# Patient Record
Sex: MALE | URBAN - METROPOLITAN AREA
[De-identification: names, ages, dates, MRNs, and addresses within clinical notes are randomized per-mention and may not be internally consistent; named-entity substitution may affect disease eponyms.]

---

## 2019-08-08 NOTE — TELEPHONE ENCOUNTER
RECORDS RECEIVED FROM: Low Back Pain. pain spreading down to left. No recent imaging. History of MID back surgery 2008. Appt per pt.    DATE RECEIVED: 8/8   NOTES STATUS DETAILS   OFFICE NOTE from referring provider N/A    OFFICE NOTE from other specialist Care Everywhere    DISCHARGE SUMMARY from hospital N/A    DISCHARGE REPORT from the ER N/A    OPERATIVE REPORT Care everywhere/ internal Internal 7/2/15  [facility] 2/18/08   MEDICATION LIST Internal    LABS     CBC/DIFF N/A    CULTURES N/A    MRI Internal    CT SCAN N/A    XRAYS (IMAGES & REPORTS) recieved [facility] 2008   TUMOR     PATHOLOGY  Slides & report N/A

## 2019-08-13 ENCOUNTER — PRE VISIT (OUTPATIENT)
Dept: ORTHOPEDICS | Facility: CLINIC | Age: 59
End: 2019-08-13

## 2019-08-13 ENCOUNTER — ANCILLARY PROCEDURE (OUTPATIENT)
Dept: GENERAL RADIOLOGY | Facility: CLINIC | Age: 59
End: 2019-08-13
Attending: FAMILY MEDICINE
Payer: COMMERCIAL

## 2019-08-13 ENCOUNTER — OFFICE VISIT (OUTPATIENT)
Dept: ORTHOPEDICS | Facility: CLINIC | Age: 59
End: 2019-08-13
Payer: COMMERCIAL

## 2019-08-13 VITALS
HEART RATE: 67 BPM | HEIGHT: 69 IN | SYSTOLIC BLOOD PRESSURE: 107 MMHG | BODY MASS INDEX: 22.96 KG/M2 | DIASTOLIC BLOOD PRESSURE: 74 MMHG | WEIGHT: 155 LBS

## 2019-08-13 DIAGNOSIS — Z98.1 HISTORY OF LUMBAR FUSION: ICD-10-CM

## 2019-08-13 DIAGNOSIS — M53.3 SACROILIAC JOINT DYSFUNCTION OF LEFT SIDE: Primary | ICD-10-CM

## 2019-08-13 DIAGNOSIS — M53.3 SACROILIAC JOINT DYSFUNCTION OF LEFT SIDE: ICD-10-CM

## 2019-08-13 RX ORDER — DIMENHYDRINATE 50 MG
TABLET ORAL
COMMUNITY
Start: 2016-01-01

## 2019-08-13 ASSESSMENT — PAIN SCALES - GENERAL: PAINLEVEL: MILD PAIN (3)

## 2019-08-13 ASSESSMENT — MIFFLIN-ST. JEOR: SCORE: 1508.46
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
## 2019-08-22 ENCOUNTER — THERAPY VISIT (OUTPATIENT)
Dept: PHYSICAL THERAPY | Facility: CLINIC | Age: 59
End: 2019-08-22
Attending: FAMILY MEDICINE
Payer: COMMERCIAL

## 2019-08-22 DIAGNOSIS — M53.3 SACROILIAC JOINT DYSFUNCTION OF LEFT SIDE: ICD-10-CM

## 2019-08-22 DIAGNOSIS — Z98.1 HISTORY OF LUMBAR FUSION: ICD-10-CM

## 2019-08-22 PROCEDURE — 97530 THERAPEUTIC ACTIVITIES: CPT | Mod: GP | Performed by: PHYSICAL THERAPIST

## 2019-08-22 PROCEDURE — 97110 THERAPEUTIC EXERCISES: CPT | Mod: GP | Performed by: PHYSICAL THERAPIST

## 2019-08-22 PROCEDURE — 97161 PT EVAL LOW COMPLEX 20 MIN: CPT | Mod: GP | Performed by: PHYSICAL THERAPIST

## 2019-08-22 NOTE — PROGRESS NOTES
[facility] Initial Evaluation  Subjective:  Pt is doing hiking/backpacking, running and biking, core exercises, hip strengthening, and squats on her own.         Walker Axt being seen for Uneven si hip joint...pain.   Problem began 7/8/2019. Where condition occurred: at home.Problem occurred: Pushing a lawn tractor  and reported as 3/10 on pain scale. General health as reported by patient is excellent. Pertinent medical history includes:  Depression.    Surgeries include:  Other. Other surgery history details: Spinal fussion (2008); left shoulder (2015); FFS (2014 & 2001); knee (1976).  Current medications:  Other. Other medications details: HRT related medications, melatonin for sleep.   Primary job tasks include:  Computer work and prolonged sitting.  Pain is described as sharp and is intermittent. Pain is the same all the time. Since onset symptoms are gradually improving. Special tests:  X-ray. Previous treatment includes chiropractic. There was mild improvement following previous treatment.   Patient is Nuclear . Restrictions include:  Working in normal job without restrictions.    Barriers include:  Other.  Red flags:  Pain at rest/night.  Type of problem:  Sacroiliac    This is a new condition    Patient reports pain:  SI joint left.   Exacerbated by: bending forward, exercise.                       Objective:  System         Lumbar/SI Evaluation  ROM:    AROM Lumbar:   Flexion:           50%, ++  Ext:                      Side Bend:        Left:  50%    Right:  50%  Rotation:           Left:  100%    Right:  50%, +  Side Glide:        Left:     Right:                       Functional Tests:  Core strength and proprioception lumbar: Squat WFL.  SL Squat increased loss of control on L.                                                   Knee Evaluation:  ROM:  Prom wnl knee: Quad flexibility limited.                                  General  Evaluation:                              Gait:  Gait wnl general: WNL.                                         ROS    Assessment/Plan:    Patient is a 59 year old adult with sacral complaints.    Patient has the following significant findings with corresponding treatment plan.                Diagnosis 1:  L SI joint pain  Decreased ROM/flexibility - manual therapy and therapeutic exercise  Decreased joint mobility - manual therapy and therapeutic exercise  Decreased strength - therapeutic exercise and therapeutic activities  Impaired muscle performance - neuro re-education  Decreased function - therapeutic activities    Therapy Evaluation Codes:   1) History comprised of:   Personal factors that impact the plan of care:      None.    Comorbidity factors that impact the plan of care are:      None.     Medications impacting care: None.  2) Examination of Body Systems comprised of:   Body structures and functions that impact the plan of care:      Lumbar spine and Sacral illiac joint.   Activity limitations that impact the plan of care are:      Bending, Lifting and Sports.  3) Clinical presentation characteristics are:   Stable/Uncomplicated.  4) Decision-Making    Low complexity using standardized patient assessment instrument and/or measureable assessment of functional outcome.  Cumulative Therapy Evaluation is: Low complexity.    Previous and current functional limitations:  (See Goal Flow Sheet for this information)    Short term and Long term goals: (See Goal Flow Sheet for this information)     Communication ability:  Patient appears to be able to clearly communicate and understand verbal and written communication and follow directions correctly.  Treatment Explanation - The following has been discussed with the patient:   RX ordered/plan of care  Anticipated outcomes  Possible risks and side effects  This patient would benefit from PT intervention to resume normal activities.   Rehab potential is  good.    Frequency:  1 X week, once daily  Duration:  for 6 weeks  Discharge Plan:  Achieve all LTG.  Independent in home treatment program.  Return to previous functional level by discharge.  Reach maximal therapeutic benefit.    Please refer to the daily flowsheet for treatment today, total treatment time and time spent performing 1:1 timed codes.

## 2019-09-04 ENCOUNTER — DOCUMENTATION ONLY (OUTPATIENT)
Dept: CARE COORDINATION | Facility: CLINIC | Age: 59
End: 2019-09-04

## 2019-09-13 ENCOUNTER — MYC MEDICAL ADVICE (OUTPATIENT)
Dept: ORTHOPEDICS | Facility: CLINIC | Age: 59
End: 2019-09-13

## 2019-09-17 ENCOUNTER — THERAPY VISIT (OUTPATIENT)
Dept: PHYSICAL THERAPY | Facility: CLINIC | Age: 59
End: 2019-09-17
Payer: COMMERCIAL

## 2019-09-17 DIAGNOSIS — M53.3 SACROILIAC JOINT DYSFUNCTION OF LEFT SIDE: Primary | ICD-10-CM

## 2019-09-17 PROCEDURE — 97530 THERAPEUTIC ACTIVITIES: CPT | Mod: GP | Performed by: PHYSICAL THERAPIST

## 2019-10-01 ENCOUNTER — HEALTH MAINTENANCE LETTER (OUTPATIENT)
Age: 59
End: 2019-10-01

## 2019-10-01 ENCOUNTER — OFFICE VISIT (OUTPATIENT)
Dept: ORTHOPEDICS | Facility: CLINIC | Age: 59
End: 2019-10-01
Payer: COMMERCIAL

## 2019-10-01 VITALS — BODY MASS INDEX: 24.02 KG/M2 | HEIGHT: 69 IN | WEIGHT: 162.2 LBS

## 2019-10-01 DIAGNOSIS — M53.3 SACROILIAC JOINT DYSFUNCTION OF LEFT SIDE: Primary | ICD-10-CM

## 2019-10-01 ASSESSMENT — PAIN SCALES - GENERAL: PAINLEVEL: NO PAIN (0)

## 2019-10-01 ASSESSMENT — MIFFLIN-ST. JEOR: SCORE: 1548.85

## 2019-10-01 NOTE — LETTER
10/1/2019      RE: Suleman LARES Axt  [address]  [place] [address]       Pt is a 59 year old adult last seen on 8/13/19 here for follow up of:     L SI pain - the patient reports improvement in pain.   4 exercises for hips and thighs  Was doing exercises 2x/day and noted significant improvement  Has now incorporated into regular eroutine 3x/week      Past Medical History:   Diagnosis Date     Shoulder pain     Scope 7/2015     Transgendered       Current Outpatient Medications   Medication Sig Dispense Refill     acetaminophen (TYLENOL) 325 MG tablet Take 2 tablets (650 mg) by mouth every 4 hours as needed for other (mild pain) (Patient not taking: Reported on 8/13/2019) 50 tablet 0     ASPIRIN PO Take 81 mg by mouth daily       cholecalciferol (VITAMIN D3) 5000 units TABS tablet        estradiol 0.01% in HRT BASE - PHARMACY TO MIX - dispense with applicator        Flaxseed, Linseed, (FLAX SEED OIL) 1000 MG capsule        hydrOXYzine (ATARAX) 25 MG tablet Take 1 tablet (25 mg) by mouth every 6 hours as needed for itching (and nausea) (Patient not taking: Reported on 8/13/2019) 30 tablet 0     ibuprofen (ADVIL,MOTRIN) 600 MG tablet Take 1 tablet (600 mg) by mouth every 6 hours as needed for pain (mild) (Patient not taking: Reported on 8/13/2019) 60 tablet 0     Multiple Vitamins-Minerals (MULTIVITAMIN PO) Take 1 tablet by mouth daily       oxyCODONE (ROXICODONE) 5 MG immediate release tablet Take 1-2 tablets (5-10 mg) by mouth every 3 hours as needed for pain or other (Moderate to Severe) (Patient not taking: Reported on 8/13/2019) 60 tablet 0     senna-docusate (SENOKOT-S;PERICOLACE) 8.6-50 MG per tablet Take 1-2 tablets by mouth 2 times daily Take while on oral narcotics to prevent or treat constipation. (Patient not taking: Reported on 8/13/2019) 20 tablet 0     SPIRONOLACTONE PO Take 400 mg by mouth daily         Allergies   Allergen Reactions     Bees Anaphylaxis      ROS:   Gen- no fevers/chills   Rheum  "- no morning stiffness   Derm - no rash/ redness   Neuro - no numbness, no tingling   Remainder of ROS negative.     Exam:   Ht 1.765 m (5' 9.49\")   Wt 73.6 kg (162 lb 3.2 oz)   BMI 23.62 kg/m       No TTP at L SI      (M53.3) Sacroiliac joint dysfunction of left side  (primary encounter diagnosis)  Comment:   Plan: greatly improved; will cont home program; f/u prn    Dae Estrada MD  October 1, 2019  10:30 AM          "

## 2019-10-01 NOTE — PROGRESS NOTES
Pt is a 59 year old adult last seen on 8/13/19 here for follow up of:     L SI pain - the patient reports improvement in pain.   4 exercises for hips and thighs  Was doing exercises 2x/day and noted significant improvement  Has now incorporated into regular eroutine 3x/week      Past Medical History:   Diagnosis Date     Shoulder pain     Scope 7/2015     Transgendered       Current Outpatient Medications   Medication Sig Dispense Refill     acetaminophen (TYLENOL) 325 MG tablet Take 2 tablets (650 mg) by mouth every 4 hours as needed for other (mild pain) (Patient not taking: Reported on 8/13/2019) 50 tablet 0     ASPIRIN PO Take 81 mg by mouth daily       cholecalciferol (VITAMIN D3) 5000 units TABS tablet        estradiol 0.01% in HRT BASE - PHARMACY TO MIX - dispense with applicator        Flaxseed, Linseed, (FLAX SEED OIL) 1000 MG capsule        hydrOXYzine (ATARAX) 25 MG tablet Take 1 tablet (25 mg) by mouth every 6 hours as needed for itching (and nausea) (Patient not taking: Reported on 8/13/2019) 30 tablet 0     ibuprofen (ADVIL,MOTRIN) 600 MG tablet Take 1 tablet (600 mg) by mouth every 6 hours as needed for pain (mild) (Patient not taking: Reported on 8/13/2019) 60 tablet 0     Multiple Vitamins-Minerals (MULTIVITAMIN PO) Take 1 tablet by mouth daily       oxyCODONE (ROXICODONE) 5 MG immediate release tablet Take 1-2 tablets (5-10 mg) by mouth every 3 hours as needed for pain or other (Moderate to Severe) (Patient not taking: Reported on 8/13/2019) 60 tablet 0     senna-docusate (SENOKOT-S;PERICOLACE) 8.6-50 MG per tablet Take 1-2 tablets by mouth 2 times daily Take while on oral narcotics to prevent or treat constipation. (Patient not taking: Reported on 8/13/2019) 20 tablet 0     SPIRONOLACTONE PO Take 400 mg by mouth daily         Allergies   Allergen Reactions     Bees Anaphylaxis      ROS:   Gen- no fevers/chills   Rheum - no morning stiffness   Derm - no rash/ redness   Neuro - no numbness, no  "tingling   Remainder of ROS negative.     Exam:   Ht 1.765 m (5' 9.49\")   Wt 73.6 kg (162 lb 3.2 oz)   BMI 23.62 kg/m      No TTP at L SI      (M53.3) Sacroiliac joint dysfunction of left side  (primary encounter diagnosis)  Comment:   Plan: greatly improved; will cont home program; f/u prn    Dae Estrada MD  October 1, 2019  10:30 AM      "

## 2019-10-01 NOTE — LETTER
10/1/2019      RE: Suleman LARES Axt  [address]  [place] [address]       Pt is a 59 year old adult last seen on 8/13/19 here for follow up of:     L SI pain - the patient reports improvement in pain.   4 exercises for hips and thighs  Was doing exercises 2x/day and noted significant improvement  Has now incorporated into regular eroutine 3x/week      Past Medical History:   Diagnosis Date     Shoulder pain     Scope 7/2015     Transgendered       Current Outpatient Medications   Medication Sig Dispense Refill     acetaminophen (TYLENOL) 325 MG tablet Take 2 tablets (650 mg) by mouth every 4 hours as needed for other (mild pain) (Patient not taking: Reported on 8/13/2019) 50 tablet 0     ASPIRIN PO Take 81 mg by mouth daily       cholecalciferol (VITAMIN D3) 5000 units TABS tablet        estradiol 0.01% in HRT BASE - PHARMACY TO MIX - dispense with applicator        Flaxseed, Linseed, (FLAX SEED OIL) 1000 MG capsule        hydrOXYzine (ATARAX) 25 MG tablet Take 1 tablet (25 mg) by mouth every 6 hours as needed for itching (and nausea) (Patient not taking: Reported on 8/13/2019) 30 tablet 0     ibuprofen (ADVIL,MOTRIN) 600 MG tablet Take 1 tablet (600 mg) by mouth every 6 hours as needed for pain (mild) (Patient not taking: Reported on 8/13/2019) 60 tablet 0     Multiple Vitamins-Minerals (MULTIVITAMIN PO) Take 1 tablet by mouth daily       oxyCODONE (ROXICODONE) 5 MG immediate release tablet Take 1-2 tablets (5-10 mg) by mouth every 3 hours as needed for pain or other (Moderate to Severe) (Patient not taking: Reported on 8/13/2019) 60 tablet 0     senna-docusate (SENOKOT-S;PERICOLACE) 8.6-50 MG per tablet Take 1-2 tablets by mouth 2 times daily Take while on oral narcotics to prevent or treat constipation. (Patient not taking: Reported on 8/13/2019) 20 tablet 0     SPIRONOLACTONE PO Take 400 mg by mouth daily         Allergies   Allergen Reactions     Bees Anaphylaxis      ROS:   Gen- no fevers/chills  "  Rheum - no morning stiffness   Derm - no rash/ redness   Neuro - no numbness, no tingling   Remainder of ROS negative.     Exam:   Ht 1.765 m (5' 9.49\")   Wt 73.6 kg (162 lb 3.2 oz)   BMI 23.62 kg/m       No TTP at L SI      (M53.3) Sacroiliac joint dysfunction of left side  (primary encounter diagnosis)  Comment:   Plan: greatly improved; will cont home program; f/u prn    Dae Estrada MD  October 1, 2019  10:30 AM        Dae Estrada MD    "

## 2020-01-09 NOTE — PROGRESS NOTES
DISCHARGE SUMMARY    Walker Axt was seen 2 times for evaluation and treatment.  Patient did not return for further treatment and current status is unknown.  Due to short treatment duration, no objective or functional changes were made.  Please see goal flow sheet from episode noted date below and initial evaluation for further information.  Patient is discharged from therapy and therapy episode is resolved as of 01/09/20.      No linked episodes

## 2021-01-15 ENCOUNTER — HEALTH MAINTENANCE LETTER (OUTPATIENT)
Age: 61
End: 2021-01-15

## 2021-05-27 ENCOUNTER — RECORDS - HEALTHEAST (OUTPATIENT)
Dept: ADMINISTRATIVE | Facility: CLINIC | Age: 61
End: 2021-05-27

## 2021-09-04 ENCOUNTER — HEALTH MAINTENANCE LETTER (OUTPATIENT)
Age: 61
End: 2021-09-04

## 2022-02-13 ENCOUNTER — HEALTH MAINTENANCE LETTER (OUTPATIENT)
Age: 62
End: 2022-02-13

## 2022-09-30 ENCOUNTER — TELEPHONE (OUTPATIENT)
Dept: BEHAVIORAL HEALTH | Facility: CLINIC | Age: 62
End: 2022-09-30

## 2022-09-30 NOTE — TELEPHONE ENCOUNTER
Writer spoke with patient on que and searched for referral for medication management or to see if patient had been to ED. Writer provided information about [facility] being short-term services and referral based. Maria Ines provided places to call such as [facility] for medication management appointments.    Ashanti Calixto  09/30/22  [phone]

## 2022-10-16 ENCOUNTER — HEALTH MAINTENANCE LETTER (OUTPATIENT)
Age: 62
End: 2022-10-16

## 2023-03-26 ENCOUNTER — HEALTH MAINTENANCE LETTER (OUTPATIENT)
Age: 63
End: 2023-03-26

## 2024-06-01 ENCOUNTER — HEALTH MAINTENANCE LETTER (OUTPATIENT)
Age: 64
End: 2024-06-01